# Patient Record
Sex: FEMALE | Race: WHITE | NOT HISPANIC OR LATINO | Employment: FULL TIME | URBAN - METROPOLITAN AREA
[De-identification: names, ages, dates, MRNs, and addresses within clinical notes are randomized per-mention and may not be internally consistent; named-entity substitution may affect disease eponyms.]

---

## 2017-09-06 PROBLEM — T83.718A EROSION OF BLADDER SUSPENSION MESH: Status: ACTIVE | Noted: 2017-09-06

## 2019-04-11 ENCOUNTER — INITIAL CONSULT (OUTPATIENT)
Dept: NEUROSURGERY | Facility: CLINIC | Age: 44
End: 2019-04-11
Payer: COMMERCIAL

## 2019-04-11 VITALS
WEIGHT: 150.44 LBS | HEART RATE: 97 BPM | SYSTOLIC BLOOD PRESSURE: 149 MMHG | DIASTOLIC BLOOD PRESSURE: 94 MMHG | BODY MASS INDEX: 25.07 KG/M2 | HEIGHT: 65 IN

## 2019-04-11 DIAGNOSIS — M54.2 NECK PAIN, BILATERAL: ICD-10-CM

## 2019-04-11 DIAGNOSIS — M50.30 DDD (DEGENERATIVE DISC DISEASE), CERVICAL: Primary | ICD-10-CM

## 2019-04-11 PROCEDURE — 99999 PR PBB SHADOW E&M-EST. PATIENT-LVL III: ICD-10-PCS | Mod: PBBFAC,,, | Performed by: NEUROLOGICAL SURGERY

## 2019-04-11 PROCEDURE — 99999 PR PBB SHADOW E&M-EST. PATIENT-LVL III: CPT | Mod: PBBFAC,,, | Performed by: NEUROLOGICAL SURGERY

## 2019-04-11 PROCEDURE — 99244 PR OFFICE CONSULTATION,LEVEL IV: ICD-10-PCS | Mod: S$GLB,,, | Performed by: NEUROLOGICAL SURGERY

## 2019-04-11 PROCEDURE — 99244 OFF/OP CNSLTJ NEW/EST MOD 40: CPT | Mod: S$GLB,,, | Performed by: NEUROLOGICAL SURGERY

## 2019-04-11 NOTE — LETTER
April 26, 2019      Memo Morton MD  [address] [facility] [address]           [facility]  [address] [facility] [address] [place] [address]  Phone: [phone]  Fax: [phone]          Patient: Rica Gao   MR Number: [id number]   YOB: 1975   Date of Visit: 4/11/2019       Dear Dr. Memo Morton:    Thank you for referring Rica Gao to me for evaluation. Attached you will find relevant portions of my assessment and plan of care.    If you have questions, please do not hesitate to call me. I look forward to following Rica Gao along with you.    Sincerely,    Jack Unger MD    Enclosure  CC:  No Recipients    If you would like to receive this communication electronically, please contact [email or web address] or [phone] to request more information on Similar Pages Link access.    For providers and/or their staff who would like to refer a patient to [facility], please contact us through our one-stop-shop provider referral line, [facility], at [phone].    If you feel you have received this communication in error or would no longer like to receive these types of communications, please e-mail [email or web address]

## 2019-04-26 PROBLEM — M54.2 NECK PAIN, BILATERAL: Status: ACTIVE | Noted: 2019-04-26

## 2019-04-26 PROBLEM — M50.30 DDD (DEGENERATIVE DISC DISEASE), CERVICAL: Status: ACTIVE | Noted: 2019-04-26

## 2019-04-26 NOTE — PROGRESS NOTES
Neurosurgery Outpatient Follow Up    Patient ID: Rica Gao is a 43 y.o. female.    Chief Complaint   Patient presents with    Cervical Spine Pain (C-spine)     patient reports cervical pain over one year that increased over the last several monthes; pain radiates into the bilateral arms with numbness and tingling; c/o dropping items with both hands; pain 6/10 oswestry 46 PHQ 11           Review of Systems   Musculoskeletal: Positive for neck pain and neck stiffness.   Neurological: Positive for numbness.       Past Medical History:   Diagnosis Date    Anxiety     Diverticulosis     Endometriosis     GERD (gastroesophageal reflux disease)     MVP (mitral valve prolapse)     history, asymptomatic    Renal calculus, right      Social History     Socioeconomic History    Marital status: Single     Spouse name: Not on file    Number of children: Not on file    Years of education: Not on file    Highest education level: Not on file   Occupational History    Not on file   Social Needs    Financial resource strain: Not on file    Food insecurity:     Worry: Not on file     Inability: Not on file    Transportation needs:     Medical: Not on file     Non-medical: Not on file   Tobacco Use    Smoking status: Current Every Day Smoker     Packs/day: 0.25     Types: Cigarettes    Smokeless tobacco: Never Used   Substance and Sexual Activity    Alcohol use: No    Drug use: No    Sexual activity: Not on file   Lifestyle    Physical activity:     Days per week: Not on file     Minutes per session: Not on file    Stress: Not on file   Relationships    Social connections:     Talks on phone: Not on file     Gets together: Not on file     Attends Anabaptism service: Not on file     Active member of club or organization: Not on file     Attends meetings of clubs or organizations: Not on file     Relationship status: Not on file   Other Topics Concern    Not on file   Social History Narrative    Not on file  "    Family History   Problem Relation Age of Onset    Mitral valve prolapse Mother     Heart disease Father     Diabetes Maternal Grandmother     Heart disease Maternal Grandfather     Cancer Paternal Grandfather      Review of patient's allergies indicates:   Allergen Reactions    Aleve [naproxen sodium] Rash       Current Outpatient Medications:     dextroamphetamine-amphetamine (ADDERALL XR) 20 MG 24 hr capsule, Take 20 mg by mouth every morning., Disp: , Rfl:     pantoprazole (PROTONIX) 40 MG tablet, Take 40 mg by mouth once daily., Disp: , Rfl:     biotin 1,000 mcg Chew, Take by mouth., Disp: , Rfl:     psyllium 0.52 gram capsule, Take 0.52 g by mouth once daily., Disp: , Rfl:     temazepam (RESTORIL) 22.5 MG capsule, Take 22.5 mg by mouth nightly as needed for Insomnia., Disp: , Rfl:   Blood pressure (!) 149/94, pulse 97, height 5' 5" (1.651 m), weight 68.2 kg (150 lb 7.4 oz).      Neurologic Exam     Mental Status   Oriented to person, place, and time.   Speech: speech is normal     Cranial Nerves     CN III, IV, VI   Pupils are equal, round, and reactive to light.  Extraocular motions are normal.     Gait, Coordination, and Reflexes     Reflexes   Right brachioradialis: 2+  Left brachioradialis: 2+  Right biceps: 2+  Left biceps: 2+  Right triceps: 2+  Left triceps: 2+  Right patellar: 2+  Left patellar: 2+  Right achilles: 2+  Left achilles: 2+      Physical Exam   Constitutional: She is oriented to person, place, and time. She appears well-developed and well-nourished.   HENT:   Head: Normocephalic and atraumatic.   Eyes: Pupils are equal, round, and reactive to light. EOM are normal.   Neck: Normal range of motion. Neck supple.   Cardiovascular: Normal rate and intact distal pulses.   Pulmonary/Chest: Effort normal. No respiratory distress.   Abdominal: Soft. She exhibits no distension.   Musculoskeletal: Normal range of motion. She exhibits no edema or deformity.   Neurological: She is " oriented to person, place, and time. She displays no atrophy, no tremor and normal reflexes. No cranial nerve deficit or sensory deficit. She exhibits normal muscle tone. She displays no seizure activity. Coordination and gait normal. GCS eye subscore is 4. GCS verbal subscore is 5. GCS motor subscore is 6. She displays no Babinski's sign on the right side. She displays no Babinski's sign on the left side.   Reflex Scores:       Tricep reflexes are 2+ on the right side and 2+ on the left side.       Bicep reflexes are 2+ on the right side and 2+ on the left side.       Brachioradialis reflexes are 2+ on the right side and 2+ on the left side.       Patellar reflexes are 2+ on the right side and 2+ on the left side.       Achilles reflexes are 2+ on the right side and 2+ on the left side.  Skin: Skin is warm and dry.   Psychiatric: She has a normal mood and affect. Her speech is normal and behavior is normal. Judgment and thought content normal.   Nursing note and vitals reviewed.      Provider dictation:  The patient is a 43-year-old right-handed female referred for evaluation of neck pain and inter scapular pain. She has a history of intermittent neck pain for one year that increased over the last several monthes; the pain radiates into the bilateral arms with numbness and tingling radiating into the shoulder and scapulae. There pain is not alleviated by any movements, it does improve with rest. She has tried PT without benefit, and has not been had [facility]. She described the pain as 6/10 and affects her function leading her drop objects.    Her neck disability index score is 46% and her PHQ screening is 11 indicating mild depression.     On examination she is a well appearing and neurologically intact.     I have reviewed the cervical MRI which demonstrates normal cervical curvature with mild degenerative disc disease and mild facet arthropathy at C5/6 and C6/7. Despite the loss of disk height, and two  disk  herniations, there is no significant cervical stenosis, no cord or nerve root compression.    I have showed the images to the patient and discussed the natural history, the pathology on the treatment options. The degree of stenosis does not warrant surgical decompression.    Her sleeping position, posture, and daily activities may play a significant role in her reported neck pain. We will see her again in 1 years time if her symptoms persist.    Visit Diagnosis:  DDD (degenerative disc disease), cervical    Neck pain, bilateral

## 2019-08-15 ENCOUNTER — TELEPHONE (OUTPATIENT)
Dept: NEUROSURGERY | Facility: CLINIC | Age: 44
End: 2019-08-15

## 2019-08-15 NOTE — TELEPHONE ENCOUNTER
Called pt to notify of needing to reschedule surgery. No answer. Left voicemail. Also called emergency contact listed as her daughter. The person that answered states I had the wrong number.

## 2019-08-15 NOTE — TELEPHONE ENCOUNTER
Called pt to notify that her surgery needs to be rescheduled with Dr. Unger. No answer. Left voicemail.